# Patient Record
Sex: MALE | Race: BLACK OR AFRICAN AMERICAN | NOT HISPANIC OR LATINO | Employment: UNEMPLOYED | URBAN - METROPOLITAN AREA
[De-identification: names, ages, dates, MRNs, and addresses within clinical notes are randomized per-mention and may not be internally consistent; named-entity substitution may affect disease eponyms.]

---

## 2024-06-07 NOTE — H&P (VIEW-ONLY)
Chief Compliant:  Right small finger mallet     History:     Patient present with a right small finger mallet.  He reports this occurred on 6/4/2024 when he fell down several steps and caught himself with his right small finger.  He went to an ER where they placed him in a splint.  He is here today with his significant other.  He is right-hand dominant.  He is currently not working.  He smokes cigarettes.  Not a diabetic.    Mechanism of Injury:  Trip and fall  Date of Injury / Onset:  6/4/2024  Previous Treatment:  Seen at outside ER    Previous history of Injury:  None    Right upper Extremity Physical Exam:     Exam of his right hand demonstrates that he has an extensor lag of his DIP.  He has a mild swan-neck and already due to this.  Neurovascular intact otherwise.    Radiology / Other Tests:     3 views of his right small finger taken today demonstrate that there is a extension lag.  There is no bony involvement.    Impression:     Right small finger mallet    Plan:     Had a lengthy discussion with the patient regarding mallet fingers.  He understands this is typically a nonoperative thing and I can place him in extension splinting for 8 weeks.  He understands that every time the splint comes off the clock restarts.  Regardless of treatment typically you have some sort of a extension lag after treatment.  I could also place a pin in his small finger to keep his DIP straight.  If I did this I would also like him to wear a brace.  He would like to proceed with pinning of the small finger.  He understands that this is done under wide-awake local anesthesia.  He understands that this is also a totally elective procedure and this is something that can be treated nonoperatively.  I did provide him 2 splints today that would like him to wear even after the pending.  He understands the pin can back out.  I will see him 2 weeks postoperatively questions encouraged and answered no guarantees given  Follow up: 2  maria a DAWKINS MD

## 2024-06-17 RX ORDER — ALBUTEROL SULFATE 90 UG/1
2 AEROSOL, METERED RESPIRATORY (INHALATION) EVERY 4 HOURS PRN
COMMUNITY

## 2024-06-17 RX ORDER — FLUOXETINE HYDROCHLORIDE 20 MG/1
20 CAPSULE ORAL DAILY
COMMUNITY

## 2024-06-17 RX ORDER — HYDROXYZINE HYDROCHLORIDE 25 MG/1
25 TABLET, FILM COATED ORAL 3 TIMES DAILY PRN
COMMUNITY

## 2024-06-18 ENCOUNTER — ANESTHESIA (OUTPATIENT)
Dept: PERIOP | Facility: HOSPITAL | Age: 29
End: 2024-06-18
Payer: COMMERCIAL

## 2024-06-18 ENCOUNTER — HOSPITAL ENCOUNTER (OUTPATIENT)
Facility: HOSPITAL | Age: 29
Setting detail: HOSPITAL OUTPATIENT SURGERY
Discharge: HOME | End: 2024-06-18
Attending: STUDENT IN AN ORGANIZED HEALTH CARE EDUCATION/TRAINING PROGRAM | Admitting: STUDENT IN AN ORGANIZED HEALTH CARE EDUCATION/TRAINING PROGRAM
Payer: COMMERCIAL

## 2024-06-18 ENCOUNTER — APPOINTMENT (OUTPATIENT)
Dept: GENERAL RADIOLOGY | Facility: HOSPITAL | Age: 29
End: 2024-06-18
Payer: COMMERCIAL

## 2024-06-18 ENCOUNTER — ANESTHESIA EVENT (OUTPATIENT)
Dept: PERIOP | Facility: HOSPITAL | Age: 29
End: 2024-06-18
Payer: COMMERCIAL

## 2024-06-18 VITALS
TEMPERATURE: 98.4 F | HEIGHT: 68 IN | OXYGEN SATURATION: 99 % | DIASTOLIC BLOOD PRESSURE: 95 MMHG | BODY MASS INDEX: 22.82 KG/M2 | HEART RATE: 80 BPM | RESPIRATION RATE: 16 BRPM | WEIGHT: 150.57 LBS | SYSTOLIC BLOOD PRESSURE: 140 MMHG

## 2024-06-18 PROCEDURE — 76000 FLUOROSCOPY <1 HR PHYS/QHP: CPT

## 2024-06-18 RX ORDER — MAGNESIUM HYDROXIDE 1200 MG/15ML
LIQUID ORAL AS NEEDED
Status: DISCONTINUED | OUTPATIENT
Start: 2024-06-18 | End: 2024-06-18 | Stop reason: HOSPADM

## 2024-06-18 RX ORDER — CEPHALEXIN 500 MG/1
500 CAPSULE ORAL ONCE
Status: COMPLETED | OUTPATIENT
Start: 2024-06-18 | End: 2024-06-18

## 2024-06-18 RX ADMIN — CEPHALEXIN 500 MG: 500 CAPSULE ORAL at 12:29

## 2024-06-18 RX ADMIN — LIDOCAINE HYDROCHLORIDE,EPINEPHRINE BITARTRATE 10 ML: 10; .01 INJECTION, SOLUTION INFILTRATION; PERINEURAL at 13:00

## 2024-06-18 NOTE — OP NOTE
FINGER MALLET REPAIR  Procedure Note    Hemal Sky  6/18/2024    Pre-op Diagnosis: Right small finger mallet.   Post-op Diagnosis: Same  Procedure: Closed reduction and pinning right small finger mallet finger  Surgeon:  Loree Chadwick MD  Assistant: None  Anesthesia: Local, No anesthesia staff entered.  Staff: Circulator: Loree Price RN  Scrub Person: López Johnson  Other: Mikala Dobbins RN; Niurka Hernández RN CFA  Estimated Blood Loss: None  Specimens: * No orders in the log *  Drains: none  Complications: None    Components Utilized:    Nothing was implanted during the procedure    Indication for Procedure:  This patient is a 29 y.o. male who had a ground-level fall catching himself with his hand suffering a mallet finger.  Surgical options and non-surgical options were discussed in detail and to the patient's satisfaction.  Surgical intervention was recommended based on the patient's injury and functional status.  He understands this is some that can typically be treated nonoperatively with full-time extension splinting for 6 to 8 weeks.  He elected to proceed with K wire fixation.    The risks and benefits of surgery were discussed with patient and informed consent was obtained.  Risks include but are not limited to, infection, bleeding, nerve injury, blood clots, risks associated with anesthesia, need for further surgery, persistent pain, and possibly death.    DESCRIPTION OF PROCEDURE:     The patient was seen in Preoperative Holding Area where their surgical site was marked.  I did perform a preoperative block using a total of 5 cc of lidocaine 1% mixed with epi and bicarb.  He tolerated this well.  Appropriate anesthesia was obtained and he was taken back to the operative suite.  His right upper extremity was prepped and draped in normal sterile fashion.  For the start procedure a timeout was performed to create the right patient the right procedure on the sacrum in the room.  I  selected a 0.054 K wire and under fluoroscopy was able to maintain reduction of the joint and placed the K wire in the center of the distal phalanx.  I was unable to get it into the middle phalanx.  I was unable to acquire it all the way down the shaft but I was happy with the fixation and it was stable.  X-rays were taken both AP and lateral.  The wire was cut flush beneath the skin.  Wound was irrigated and a Band-Aid was applied.    Postoperative Plan:  He is okay to use a Band-Aid to keep it covered.  He is okay to change this to shower to soak it to get it wet.  I will see him in 2 weeks to get x-rays.  The pin will hopefully stay in for a total of 6 weeks and will get pulled in clinic.    Loree Chadwick MD

## 2024-06-19 RX ORDER — HYDROCODONE BITARTRATE AND ACETAMINOPHEN 5; 325 MG/1; MG/1
1 TABLET ORAL EVERY 4 HOURS PRN
Qty: 5 TABLET | Refills: 0 | Status: SHIPPED | OUTPATIENT
Start: 2024-06-19

## (undated) DEVICE — BNDG,ELSTC,MATRIX,STRL,2"X5YD,LF,HOOK&LP: Brand: MEDLINE

## (undated) DEVICE — TOWEL,OR,DSP,ST,BLUE,STD,4/PK,20PK/CS: Brand: MEDLINE

## (undated) DEVICE — SUT ETHLN 5/0 PS2 18IN 1666H

## (undated) DEVICE — APPL CHLORAPREP HI/LITE 26ML ORNG

## (undated) DEVICE — PK ORTHO MINOR TOWER 40

## (undated) DEVICE — SPNG GZ WOVN 4X4IN 12PLY 10/BX STRL

## (undated) DEVICE — GLV SURG BIOGEL LTX PF 7

## (undated) DEVICE — SOL ISO/ALC 70PCT 4OZ

## (undated) DEVICE — WEBRIL* CAST PADDING: Brand: DEROYAL

## (undated) DEVICE — SPLNT PLSTR 10 FAST 4X15IN

## (undated) DEVICE — BNDG,ELSTC,MATRIX,STRL,3"X5YD,LF,HOOK&LP: Brand: MEDLINE

## (undated) DEVICE — KT DRP MINIVIEW STRL

## (undated) DEVICE — DRAPE,HAND,STERILE: Brand: MEDLINE

## (undated) DEVICE — DRESSING,GAUZE,XEROFORM,CURAD,1"X8",ST: Brand: CURAD

## (undated) DEVICE — DISPOSABLE TOURNIQUET CUFF SINGLE BLADDER, SINGLE PORT AND QUICK CONNECT CONNECTOR: Brand: COLOR CUFF

## (undated) DEVICE — DISPOSABLE BIPOLAR FORCEPS 4" (10.2CM) JEWELERS, STRAIGHT 0.4MM TIP AND 12 FT. (3.6M) CABLE: Brand: KIRWAN

## (undated) DEVICE — UNDRGLV SURG BIOGEL PUNCTUREINDICATION SZ7 PF STRL